# Patient Record
Sex: MALE | Race: WHITE
[De-identification: names, ages, dates, MRNs, and addresses within clinical notes are randomized per-mention and may not be internally consistent; named-entity substitution may affect disease eponyms.]

---

## 2021-12-18 ENCOUNTER — HOSPITAL ENCOUNTER (EMERGENCY)
Dept: HOSPITAL 12 - ER | Age: 61
LOS: 1 days | Discharge: ANOTHER HOSPITAL | End: 2021-12-19
Payer: COMMERCIAL

## 2021-12-18 VITALS — BODY MASS INDEX: 35.42 KG/M2 | HEIGHT: 77 IN | WEIGHT: 300 LBS
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
## 2021-12-18 DIAGNOSIS — S92.231B: Primary | ICD-10-CM

## 2021-12-18 DIAGNOSIS — Z20.822: ICD-10-CM
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
## 2021-12-18 DIAGNOSIS — S91.311A: ICD-10-CM

## 2021-12-18 DIAGNOSIS — S92.311A: ICD-10-CM

## 2021-12-18 DIAGNOSIS — S97.81XA: ICD-10-CM

## 2021-12-18 DIAGNOSIS — S92.321A: ICD-10-CM

## 2021-12-18 DIAGNOSIS — E11.9: ICD-10-CM

## 2021-12-18 PROCEDURE — A4663 DIALYSIS BLOOD PRESSURE CUFF: HCPCS

## 2021-12-18 PROCEDURE — 80048 BASIC METABOLIC PNL TOTAL CA: CPT

## 2021-12-18 PROCEDURE — 85730 THROMBOPLASTIN TIME PARTIAL: CPT

## 2021-12-18 PROCEDURE — 12002 RPR S/N/AX/GEN/TRNK2.6-7.5CM: CPT

## 2021-12-18 PROCEDURE — 90471 IMMUNIZATION ADMIN: CPT

## 2021-12-18 PROCEDURE — 96365 THER/PROPH/DIAG IV INF INIT: CPT

## 2021-12-18 PROCEDURE — 85025 COMPLETE CBC W/AUTO DIFF WBC: CPT

## 2021-12-18 PROCEDURE — 90715 TDAP VACCINE 7 YRS/> IM: CPT

## 2021-12-18 PROCEDURE — 73700 CT LOWER EXTREMITY W/O DYE: CPT

## 2021-12-18 PROCEDURE — 36415 COLL VENOUS BLD VENIPUNCTURE: CPT

## 2021-12-18 PROCEDURE — 80076 HEPATIC FUNCTION PANEL: CPT

## 2021-12-18 PROCEDURE — 87426 SARSCOV CORONAVIRUS AG IA: CPT

## 2021-12-18 PROCEDURE — 96376 TX/PRO/DX INJ SAME DRUG ADON: CPT

## 2021-12-18 PROCEDURE — 96375 TX/PRO/DX INJ NEW DRUG ADDON: CPT

## 2021-12-18 PROCEDURE — A4217 STERILE WATER/SALINE, 500 ML: HCPCS

## 2021-12-18 PROCEDURE — 99285 EMERGENCY DEPT VISIT HI MDM: CPT

## 2021-12-19 ENCOUNTER — HOSPITAL ENCOUNTER (INPATIENT)
Dept: HOSPITAL 54 - MED | Age: 61
LOS: 13 days | Discharge: HOME HEALTH | DRG: 501 | End: 2022-01-01
Attending: NURSE PRACTITIONER | Admitting: NURSE PRACTITIONER
Payer: COMMERCIAL

## 2021-12-19 VITALS — BODY MASS INDEX: 35.42 KG/M2 | HEIGHT: 77 IN | WEIGHT: 300 LBS

## 2021-12-19 VITALS — DIASTOLIC BLOOD PRESSURE: 76 MMHG | SYSTOLIC BLOOD PRESSURE: 136 MMHG

## 2021-12-19 VITALS — DIASTOLIC BLOOD PRESSURE: 78 MMHG | SYSTOLIC BLOOD PRESSURE: 142 MMHG

## 2021-12-19 VITALS — SYSTOLIC BLOOD PRESSURE: 141 MMHG | DIASTOLIC BLOOD PRESSURE: 75 MMHG

## 2021-12-19 DIAGNOSIS — D68.59: ICD-10-CM

## 2021-12-19 DIAGNOSIS — Z20.822: ICD-10-CM

## 2021-12-19 DIAGNOSIS — I10: ICD-10-CM

## 2021-12-19 DIAGNOSIS — E11.9: ICD-10-CM

## 2021-12-19 DIAGNOSIS — K59.00: ICD-10-CM

## 2021-12-19 DIAGNOSIS — S92.334A: ICD-10-CM

## 2021-12-19 DIAGNOSIS — S92.324A: ICD-10-CM
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
## 2021-12-19 DIAGNOSIS — E66.01: ICD-10-CM

## 2021-12-19 DIAGNOSIS — Z74.09: ICD-10-CM

## 2021-12-19 DIAGNOSIS — E78.5: ICD-10-CM

## 2021-12-19 DIAGNOSIS — S92.311A: Primary | ICD-10-CM
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
## 2021-12-19 LAB
ALP SERPL-CCNC: 67 U/L (ref 50–136)
ALT SERPL W/O P-5'-P-CCNC: 45 U/L (ref 16–63)
AST SERPL-CCNC: 26 U/L (ref 15–37)
BASOPHILS # BLD AUTO: 0.1 K/UL (ref 0–0.2)
BASOPHILS NFR BLD AUTO: 0.7 % (ref 0–2)
BILIRUB DIRECT SERPL-MCNC: < 0.1 MG/DL (ref 0–0.2)
BILIRUB SERPL-MCNC: 0.6 MG/DL (ref 0.2–1)
BUN SERPL-MCNC: 15 MG/DL (ref 7–18)
BUN SERPL-MCNC: 19 MG/DL (ref 7–18)
CALCIUM SERPL-MCNC: 8.7 MG/DL (ref 8.5–10.1)
CHLORIDE SERPL-SCNC: 104 MMOL/L (ref 98–107)
CHLORIDE SERPL-SCNC: 104 MMOL/L (ref 98–107)
CO2 SERPL-SCNC: 27 MMOL/L (ref 21–32)
CO2 SERPL-SCNC: 27 MMOL/L (ref 21–32)
CREAT SERPL-MCNC: 1 MG/DL (ref 0.6–1.3)
CREAT SERPL-MCNC: 1 MG/DL (ref 0.6–1.3)
EOSINOPHIL NFR BLD AUTO: 0.5 % (ref 0–6)
GLUCOSE SERPL-MCNC: 155 MG/DL (ref 74–106)
GLUCOSE SERPL-MCNC: 233 MG/DL (ref 74–106)
HCT VFR BLD AUTO: 41 % (ref 39–51)
HCT VFR BLD AUTO: 47.4 % (ref 36.7–47.1)
HGB BLD-MCNC: 13.5 G/DL (ref 13.5–17.5)
LYMPHOCYTES NFR BLD AUTO: 1.7 K/UL (ref 0.8–4.8)
LYMPHOCYTES NFR BLD AUTO: 17.9 % (ref 20–44)
MCH RBC QN AUTO: 28 UUG (ref 23.8–33.4)
MCHC RBC AUTO-ENTMCNC: 33 G/DL (ref 31–36)
MCV RBC AUTO: 84.4 FL (ref 73–96.2)
MCV RBC AUTO: 86 FL (ref 80–96)
MONOCYTES NFR BLD AUTO: 0.9 K/UL (ref 0.1–1.3)
MONOCYTES NFR BLD AUTO: 9.8 % (ref 2–12)
NEUTROPHILS # BLD AUTO: 6.7 K/UL (ref 1.8–8.9)
NEUTROPHILS NFR BLD AUTO: 71.1 % (ref 43–81)
PLATELET # BLD AUTO: 229 K/UL (ref 150–450)
PLATELET # BLD AUTO: 255 K/UL (ref 152–348)
POTASSIUM SERPL-SCNC: 3.9 MMOL/L (ref 3.5–5.1)
POTASSIUM SERPL-SCNC: 4.3 MMOL/L (ref 3.5–5.1)
RBC # BLD AUTO: 4.76 MIL/UL (ref 4.5–6)
SODIUM SERPL-SCNC: 139 MMOL/L (ref 136–145)
WBC NRBC COR # BLD AUTO: 9.5 K/UL (ref 4.3–11)
WS STN SPEC: 7.7 G/DL (ref 6.4–8.2)

## 2021-12-19 PROCEDURE — A6403 STERILE GAUZE>16 <= 48 SQ IN: HCPCS

## 2021-12-19 PROCEDURE — A6253 ABSORPT DRG > 48 SQ IN W/O B: HCPCS

## 2021-12-19 PROCEDURE — G0378 HOSPITAL OBSERVATION PER HR: HCPCS

## 2021-12-19 PROCEDURE — A4217 STERILE WATER/SALINE, 500 ML: HCPCS

## 2021-12-19 RX ADMIN — Medication SCH EACH: at 21:40

## 2021-12-19 RX ADMIN — ENOXAPARIN SODIUM SCH MG: 40 INJECTION SUBCUTANEOUS at 16:00

## 2021-12-19 RX ADMIN — INSULIN HUMAN PRN UNITS: 100 INJECTION, SOLUTION PARENTERAL at 21:45

## 2021-12-19 RX ADMIN — Medication PRN MG: at 20:18

## 2021-12-19 RX ADMIN — DEXTROSE MONOHYDRATE SCH MLS/HR: 50 INJECTION, SOLUTION INTRAVENOUS at 14:37

## 2021-12-19 RX ADMIN — Medication PRN MG: at 16:18

## 2021-12-19 RX ADMIN — Medication SCH EACH: at 17:01

## 2021-12-19 RX ADMIN — DEXTROSE MONOHYDRATE SCH MLS/HR: 50 INJECTION, SOLUTION INTRAVENOUS at 21:24

## 2021-12-19 NOTE — NUR
RN NOTE



SPOKE WITH DR BISHOP AND HE GAVE ORDERED THRU PHONE. ANCEF 1 GM EVERY 8 HRS FOR SUSPECTED 
INFECTION. ORDER READ BACK AND CARRIED OUT.

## 2021-12-19 NOTE — NUR
Received call from Dr. Oscar cramer to call podiatrist on call for consult. According to Dr. amezquita he discussed the case with Dr. Riddle.

## 2021-12-19 NOTE — NUR
RN NOTE



RECEIVED REPORT FROM NATE JAFFE OF [facility].

-------------------------------------------------------------------------------

Addendum: 12/19/21 at 0944 by LORIE GARCIA RN

-------------------------------------------------------------------------------

TIME RECEIVED WAS 0800.



CALL BACK NO [phone]

## 2021-12-19 NOTE — NUR
Spoke with Olena ESTEBAN House Supervisor of [facility], patient is 
going to [facility]. Pt is accepted by Omi Bains DNP.

## 2021-12-19 NOTE — NUR
MS RN NOTES



PATIENT COMPLAINED OF PAIN. ADMINISTERED MORPHINE 2MG IM PRN Q4H AS ORDERED BY HOSPITALIST.

## 2021-12-19 NOTE — NUR
RN NOTE



PATIENT WAS TRANSFERRED TO  [facility] VIA [facility]. A/O X4. PRIMARY LANGUAGE IS FARSI BUT CAN 
SPEAK AND UNDERSTAND ENGLISH. ABLE TO MAKE NEEDS KNOWN. A/O X4. ON ROOM AIR, TOLERATING 
WELL. DENIES SOB. IN NO APPARENT DISTRESS. VS /75 AL 83 RR 20 T 98.3 SA02 97%. IV 
ACCESS ON L AC #20, INTACT AND PATENT. NO KNOWN ALLERGIES. PATIENT HAS A KERLIX WRAPPED ON 
HIS RT FOOT D/T FRACTURE. SAFETY MEASURES MAINTAINED. BED IN LOWEST POSITION, BRAKES LOCKED. 
SIDE RAILS UP X2. CALL LIGHT WITHIN REACH. WILL CONTINUE PLAN OF CARE.

## 2021-12-19 NOTE — NUR
RN CLOSING NOTE



PATIENT RESTING IN BED. A/O X4. ON ROOM AIR, TOLERATING WELL. DENIES SOB. IN NO APPARENT 
DISTRESS. IV ACCESS ON L AC #20, INTACT AND PATENT. DUE MEDS GIVEN AS ORDERED. ALL NEEDS 
HAVE BEEN MET AND ATTENDED. SAFETY MEASURES MAINTAINED. BED IN LOWEST POSITION, BRAKES 
LOCKED. SIDE RAILS UP X2. CALL LIGHT WITHIN REACH. WILL ENDORSE CONTINUITY OF CARE TO 
INCOMING SHIFT.

## 2021-12-19 NOTE — NUR
[facility] arrived to ER to transport patient to [facility], report and 
documentation given to EMT.

## 2021-12-19 NOTE — NUR
MS RN OPENING NOTES 



RECEIVED PATIENT LAYING AWAKE IN BED. A/O X4. PATIENT WITH REGULAR AND UNLABORED BREATHING 
ON ROOM AIR, TOLERATED WELL. NO SIGNS AND SYMPTOMS OF DISTRESS NOTED. NO COMPLAINS OF PAIN 
AT THIS TIME. IV ACCESS LAC G#20 SL. ACCESS PATENT AND INTACT. SAFETY PRECAUTIONS ENFORCED 
BED LOCKED AND AT LOWEST POSITION. SIDERAILS UP X2. CALL LIGHT WITHIN REACH AT ALL TIMES. 
WILL CONTINUE TO MONITOR PATIENT.

## 2021-12-19 NOTE — NUR
[facility] called for ortho consult. according to dispatcher Dr. Riddle is on call. All info 
taken and dr will be paged. awaiting for call back from Dr Riddle.

## 2021-12-20 VITALS — DIASTOLIC BLOOD PRESSURE: 79 MMHG | SYSTOLIC BLOOD PRESSURE: 125 MMHG

## 2021-12-20 VITALS — SYSTOLIC BLOOD PRESSURE: 124 MMHG | DIASTOLIC BLOOD PRESSURE: 60 MMHG

## 2021-12-20 VITALS — DIASTOLIC BLOOD PRESSURE: 61 MMHG | SYSTOLIC BLOOD PRESSURE: 127 MMHG

## 2021-12-20 VITALS — SYSTOLIC BLOOD PRESSURE: 119 MMHG | DIASTOLIC BLOOD PRESSURE: 70 MMHG

## 2021-12-20 LAB
BASOPHILS # BLD AUTO: 0.1 K/UL (ref 0–0.2)
BASOPHILS NFR BLD AUTO: 0.7 % (ref 0–2)
BUN SERPL-MCNC: 18 MG/DL (ref 7–18)
CALCIUM SERPL-MCNC: 8.4 MG/DL (ref 8.5–10.1)
CHLORIDE SERPL-SCNC: 103 MMOL/L (ref 98–107)
CHOLEST SERPL-MCNC: 166 MG/DL (ref ?–200)
CO2 SERPL-SCNC: 25 MMOL/L (ref 21–32)
CREAT SERPL-MCNC: 0.9 MG/DL (ref 0.6–1.3)
EOSINOPHIL NFR BLD AUTO: 1 % (ref 0–6)
GLUCOSE SERPL-MCNC: 158 MG/DL (ref 74–106)
HCT VFR BLD AUTO: 40 % (ref 39–51)
HDLC SERPL-MCNC: 44 MG/DL (ref 40–60)
HGB BLD-MCNC: 13 G/DL (ref 13.5–17.5)
LDLC SERPL DIRECT ASSAY-MCNC: 98 MG/DL (ref 0–99)
LYMPHOCYTES NFR BLD AUTO: 2.6 K/UL (ref 0.8–4.8)
LYMPHOCYTES NFR BLD AUTO: 22.1 % (ref 20–44)
MAGNESIUM SERPL-MCNC: 1.9 MG/DL (ref 1.8–2.4)
MCHC RBC AUTO-ENTMCNC: 33 G/DL (ref 31–36)
MCV RBC AUTO: 85 FL (ref 80–96)
MONOCYTES NFR BLD AUTO: 1 K/UL (ref 0.1–1.3)
MONOCYTES NFR BLD AUTO: 8.8 % (ref 2–12)
NEUTROPHILS # BLD AUTO: 7.9 K/UL (ref 1.8–8.9)
NEUTROPHILS NFR BLD AUTO: 67.4 % (ref 43–81)
PHOSPHATE SERPL-MCNC: 4 MG/DL (ref 2.5–4.9)
PLATELET # BLD AUTO: 220 K/UL (ref 150–450)
POTASSIUM SERPL-SCNC: 3.8 MMOL/L (ref 3.5–5.1)
RBC # BLD AUTO: 4.64 MIL/UL (ref 4.5–6)
SODIUM SERPL-SCNC: 138 MMOL/L (ref 136–145)
TRIGL SERPL-MCNC: 96 MG/DL (ref 30–150)
WBC NRBC COR # BLD AUTO: 11.7 K/UL (ref 4.3–11)

## 2021-12-20 RX ADMIN — Medication SCH EACH: at 11:27

## 2021-12-20 RX ADMIN — DEXTROSE MONOHYDRATE SCH MLS/HR: 50 INJECTION, SOLUTION INTRAVENOUS at 12:23

## 2021-12-20 RX ADMIN — DEXTROSE MONOHYDRATE SCH MLS/HR: 50 INJECTION, SOLUTION INTRAVENOUS at 20:17

## 2021-12-20 RX ADMIN — INSULIN HUMAN PRN UNITS: 100 INJECTION, SOLUTION PARENTERAL at 07:20

## 2021-12-20 RX ADMIN — INSULIN HUMAN PRN UNITS: 100 INJECTION, SOLUTION PARENTERAL at 11:26

## 2021-12-20 RX ADMIN — Medication SCH EACH: at 07:18

## 2021-12-20 RX ADMIN — NEOMYCIN AND POLYMYXIN B SULFATES AND BACITRACIN ZINC SCH GM: 400; 3.5; 5 OINTMENT TOPICAL at 11:23

## 2021-12-20 RX ADMIN — Medication SCH EACH: at 17:10

## 2021-12-20 RX ADMIN — INSULIN HUMAN PRN UNITS: 100 INJECTION, SOLUTION PARENTERAL at 17:14

## 2021-12-20 RX ADMIN — ASPIRIN 81 MG SCH MG: 81 TABLET ORAL at 08:05

## 2021-12-20 RX ADMIN — DEXTROSE MONOHYDRATE SCH MLS/HR: 50 INJECTION, SOLUTION INTRAVENOUS at 04:47

## 2021-12-20 RX ADMIN — ENOXAPARIN SODIUM SCH MG: 40 INJECTION SUBCUTANEOUS at 16:00

## 2021-12-20 RX ADMIN — Medication PRN MG: at 00:44

## 2021-12-20 RX ADMIN — Medication SCH EACH: at 22:35

## 2021-12-20 RX ADMIN — ATORVASTATIN CALCIUM SCH MG: 40 TABLET, FILM COATED ORAL at 08:05

## 2021-12-20 NOTE — NUR
MS RN OPENING NOTES 



RECEIVED PATIENT IN BED, AWAKE, A/O X4. ON ROOM AIR, TOLERATING WELL. NO SIGNS AND SYMPTOMS 
OF ACUTE DISTRESS NOTED. NO COMPLAINTS OF PAIN AT THIS TIME. IV ACCESS LAC G#20 SL. ACCESS 
PATENT AND INTACT. SAFETY PRECAUTIONS IN PLACE:  BED LOCKED AND AT LOWEST POSITION. SIDE 
RAILS UP X2. CALL LIGHT WITHIN REACH AT ALL TIMES. WILL CONTINUE TO MONITOR PATIENT 
ACCORDINGLY.

## 2021-12-20 NOTE — NUR
NATE NOTES



CONSENT FOR IRRIGATION AND DRAINAGE SECURED AS PER DR. SANTIZO'S ORDER.

-------------------------------------------------------------------------------

Addendum: 12/20/21 at 1737 by RAIN LION RN

-------------------------------------------------------------------------------

CONSENT FOR INCISION AND DRAINAGE SECURED.

## 2021-12-20 NOTE — NUR
MS RN CLOSING NOTES 



PATIENT STILL LAYING AWAKE IN BED. A/O X4. PATIENT WITH REGULAR AND UNLABORED BREATHING ON 
ROOM AIR, TOLERATED WELL. NO SIGNS AND SYMPTOMS OF DISTRESS NOTED. NO COMPLAINS OF PAIN AT 
THIS TIME. IV ACCESS LAC G#20 SL. ACCESS PATENT AND INTACT. SAFETY PRECAUTIONS ENFORCED BED 
LOCKED AND AT LOWEST POSITION. SIDERAILS UP X2. CALL LIGHT WITHIN REACH AT ALL TIMES. WILL 
ENDORSE COLIN TO DAY SHIFT NURSE.

## 2021-12-20 NOTE — NUR
RN NOTES



SEEN BY WOUND CARE CONSULT. WOUND CARE DONE. PODIATRIST SHEELA GUEVARA AWARE OF 
PATIENT'S REFERRAL.

## 2021-12-20 NOTE — NUR
WOUND CARE CONSULT: PT SEEN WITH DR SANTIZO FOR RT FOOT SWELLING WITH FRACTURES AND SUTURED 
WOUNDS TO RT PLANTAR TOE SURFACE AND SOME OPEN AREAS TO DORSAL GREAT TOE WITH DISCOLORATION 
TO FOOT, PRESENT ON ADMISSION. RECOMMENDATIONS MADE FOR SKIN PROTECTION AND WOUND CARE.  
DISCUSSED WITH MD AND NURSING STAFF. DPM CONSULT ALSO REQUESTED TO FOLLOW WOUNDS. DR OMALLEY 
NOTIFIED. FOOT ELEVATED. MD IN AGREEMENT WITH PLAN OF CARE.

## 2021-12-20 NOTE — NUR
MS RN OPENING NOTES

Patient A&Ox4 resting in bed at this time. Able to make needs known. Denies any needs at 
this time. No signs of distress. Reminded pt. to use urinal and call if having BM for 
assistance as pt. is NWB to R foot. Patient says there is some R foot discomfort but is 
tolerable at this time. Reminded pt. will be NPO except meds post midnight for irrigation 
and drainage of R foot wound. Pt. agreeable. Will continue to monitor.

## 2021-12-20 NOTE — NUR
MS RN CLOSING NOTES 



PATIENT IN BED, AWAKE, A/O X4. ON ROOM AIR, TOLERATING WELL. NO SIGNS AND SYMPTOMS OF ACUTE 
DISTRESS NOTED. NO COMPLAINTS OF PAIN AT THIS TIME. IV ACCESS LAC G#20 SL. ACCESS PATENT AND 
INTACT. SAFETY PRECAUTIONS IN PLACE:  BED LOCKED AND AT LOWEST POSITION. SIDE RAILS UP X2. 
CALL LIGHT WITHIN REACH AT ALL TIMES. INSTRUCTED ON NPO POST MIDNIGHT, PATIENT VERBALIZED 
UNDERSTADNING OF INSTRUCTIONS GIVEN. DUE MEDS GIVEN AS ORDERED, ALL NEEDS ATTENDED AND MET. 
WILL ENDORSE TO ONCOMING SHIFT FOR COLIN.

## 2021-12-21 VITALS — SYSTOLIC BLOOD PRESSURE: 110 MMHG | DIASTOLIC BLOOD PRESSURE: 68 MMHG

## 2021-12-21 VITALS — DIASTOLIC BLOOD PRESSURE: 79 MMHG | SYSTOLIC BLOOD PRESSURE: 145 MMHG

## 2021-12-21 VITALS — DIASTOLIC BLOOD PRESSURE: 84 MMHG | SYSTOLIC BLOOD PRESSURE: 122 MMHG

## 2021-12-21 LAB
BASOPHILS # BLD AUTO: 0.1 K/UL (ref 0–0.2)
BASOPHILS NFR BLD AUTO: 0.6 % (ref 0–2)
BUN SERPL-MCNC: 21 MG/DL (ref 7–18)
CALCIUM SERPL-MCNC: 8.4 MG/DL (ref 8.5–10.1)
CHLORIDE SERPL-SCNC: 104 MMOL/L (ref 98–107)
CO2 SERPL-SCNC: 25 MMOL/L (ref 21–32)
CREAT SERPL-MCNC: 0.8 MG/DL (ref 0.6–1.3)
EOSINOPHIL NFR BLD AUTO: 1.4 % (ref 0–6)
GLUCOSE SERPL-MCNC: 145 MG/DL (ref 74–106)
HCT VFR BLD AUTO: 37 % (ref 39–51)
HGB BLD-MCNC: 12.6 G/DL (ref 13.5–17.5)
LYMPHOCYTES NFR BLD AUTO: 2.5 K/UL (ref 0.8–4.8)
LYMPHOCYTES NFR BLD AUTO: 24.6 % (ref 20–44)
MAGNESIUM SERPL-MCNC: 2 MG/DL (ref 1.8–2.4)
MCHC RBC AUTO-ENTMCNC: 34 G/DL (ref 31–36)
MCV RBC AUTO: 85 FL (ref 80–96)
MONOCYTES NFR BLD AUTO: 0.9 K/UL (ref 0.1–1.3)
MONOCYTES NFR BLD AUTO: 9.2 % (ref 2–12)
NEUTROPHILS # BLD AUTO: 6.4 K/UL (ref 1.8–8.9)
NEUTROPHILS NFR BLD AUTO: 64.2 % (ref 43–81)
PHOSPHATE SERPL-MCNC: 3.9 MG/DL (ref 2.5–4.9)
PLATELET # BLD AUTO: 202 K/UL (ref 150–450)
POTASSIUM SERPL-SCNC: 3.5 MMOL/L (ref 3.5–5.1)
RBC # BLD AUTO: 4.35 MIL/UL (ref 4.5–6)
SODIUM SERPL-SCNC: 138 MMOL/L (ref 136–145)
WBC NRBC COR # BLD AUTO: 10 K/UL (ref 4.3–11)

## 2021-12-21 PROCEDURE — 0KBV0ZZ EXCISION OF RIGHT FOOT MUSCLE, OPEN APPROACH: ICD-10-PCS | Performed by: STUDENT IN AN ORGANIZED HEALTH CARE EDUCATION/TRAINING PROGRAM

## 2021-12-21 RX ADMIN — NEOMYCIN AND POLYMYXIN B SULFATES AND BACITRACIN ZINC SCH GM: 400; 3.5; 5 OINTMENT TOPICAL at 08:16

## 2021-12-21 RX ADMIN — Medication SCH EACH: at 22:38

## 2021-12-21 RX ADMIN — Medication PRN MG: at 14:46

## 2021-12-21 RX ADMIN — Medication SCH EACH: at 06:46

## 2021-12-21 RX ADMIN — ENOXAPARIN SODIUM SCH MG: 40 INJECTION SUBCUTANEOUS at 16:00

## 2021-12-21 RX ADMIN — Medication PRN MG: at 01:21

## 2021-12-21 RX ADMIN — INSULIN HUMAN PRN UNITS: 100 INJECTION, SOLUTION PARENTERAL at 22:48

## 2021-12-21 RX ADMIN — DEXTROSE MONOHYDRATE SCH MLS/HR: 50 INJECTION, SOLUTION INTRAVENOUS at 12:08

## 2021-12-21 RX ADMIN — Medication PRN MG: at 20:54

## 2021-12-21 RX ADMIN — INSULIN HUMAN PRN UNITS: 100 INJECTION, SOLUTION PARENTERAL at 16:55

## 2021-12-21 RX ADMIN — DEXTROSE MONOHYDRATE SCH MLS/HR: 50 INJECTION, SOLUTION INTRAVENOUS at 22:39

## 2021-12-21 RX ADMIN — INSULIN HUMAN PRN UNITS: 100 INJECTION, SOLUTION PARENTERAL at 12:08

## 2021-12-21 RX ADMIN — ATORVASTATIN CALCIUM SCH MG: 40 TABLET, FILM COATED ORAL at 08:13

## 2021-12-21 RX ADMIN — ASPIRIN 81 MG SCH MG: 81 TABLET ORAL at 08:13

## 2021-12-21 RX ADMIN — DEXTROSE MONOHYDRATE SCH MLS/HR: 50 INJECTION, SOLUTION INTRAVENOUS at 05:16

## 2021-12-21 RX ADMIN — Medication SCH EACH: at 12:08

## 2021-12-21 RX ADMIN — Medication SCH EACH: at 16:55

## 2021-12-21 NOTE — NUR
RN OPENING NOTE



RECEIVED PATIENT IN BED. A/O X4. ON ROOM AIR, TOLERATING WELL. DENIES SOB. IN NO APPARENT 
DISTRESS. CHANGED DIET TO NPO EXCEPT MEDS AS ORDERED BY DR SANTIZO. PATIENT WILL BE GOING 
FOR I & D OF THE RIGHT FOOT. NON WEIGHT BEARING ON R FT. IV ACCESS ON L AC #20 G, INTACT AND 
PATENT. SAFETY MEASURES MAINTAINED. BED IN LOWEST POSITION, BRAKES, LOCKED. SIDE RAILS UP 
X2. CALL LIGHT WITHIN REACH. WILL CONTINUE PLAN OF CARE.

## 2021-12-21 NOTE — NUR
MS RN OPENING NOTE



PATIENT RECEIVED AWAKE IN BED. NO S/S OF DISTRESS, BREATHING SYMMETRICAL. LAC #20 SL PATENT. 
SAFETY MEASURES IN PLACE: BED AT LOWEST POSITION, RAILS UP X2, CALL BELL WITHIN REACH. WILL 
CONTINUE TO MONITOR PATIENT.

## 2021-12-21 NOTE — NUR
Patient A&Ox4. VSS overnight. Only c/o is of foot pain to R foot fx site. Pain management as 
per MD order effective. Dressing is c/d/i. Patient is NWB to R foot. Patient has been NPO 
since midnight for AM I&D procedure. No s/s of hypo or hyperglycemic reactions noted. AM BS 
137 insulin held d/t patient NPO status. Tolerating IV ABX well.

## 2021-12-21 NOTE — NUR
RN CLOSING NOTE



PATIENT RESTING IN BED. A/O X4. ON ROOM AIR, TOLERATING WELL. DENIES SOB. IN NO APPARENT 
DISTRESS. NON WEIGHT BEARING ON R FT. IV ACCESS ON L AC #20 G, INTACT AND PATENT. DUE MEDS 
GIVEN AS ORDERED. ALL NEEDS HAVE BEEN MET AND ATTENDED. SAFETY MEASURES MAINTAINED. BED IN 
LOWEST POSITION, BRAKES, LOCKED. SIDE RAILS UP X2. CALL LIGHT WITHIN REACH. WILL ENDORSE 
CONTINUITY OF CARE TO INCOMING SHIFT.

## 2021-12-21 NOTE — NUR
RN NOTE



PATIENT WAS BROUGHT BACK TO HIS ROOM, STABLE. /77 UT 68 RR 19 SA02 96% ORDERS ARE TO 
CONTINUE ALL MEDS, DIET AND NON WEIGHT BEARING ON RLE.

## 2021-12-22 VITALS — DIASTOLIC BLOOD PRESSURE: 71 MMHG | SYSTOLIC BLOOD PRESSURE: 128 MMHG

## 2021-12-22 VITALS — SYSTOLIC BLOOD PRESSURE: 136 MMHG | DIASTOLIC BLOOD PRESSURE: 88 MMHG

## 2021-12-22 VITALS — SYSTOLIC BLOOD PRESSURE: 126 MMHG | DIASTOLIC BLOOD PRESSURE: 84 MMHG

## 2021-12-22 LAB
BASOPHILS # BLD AUTO: 0 K/UL (ref 0–0.2)
BASOPHILS NFR BLD AUTO: 0.3 % (ref 0–2)
BUN SERPL-MCNC: 20 MG/DL (ref 7–18)
CALCIUM SERPL-MCNC: 8.6 MG/DL (ref 8.5–10.1)
CHLORIDE SERPL-SCNC: 103 MMOL/L (ref 98–107)
CO2 SERPL-SCNC: 25 MMOL/L (ref 21–32)
CREAT SERPL-MCNC: 0.8 MG/DL (ref 0.6–1.3)
EOSINOPHIL NFR BLD AUTO: 0.8 % (ref 0–6)
GLUCOSE SERPL-MCNC: 175 MG/DL (ref 74–106)
HCT VFR BLD AUTO: 40 % (ref 39–51)
HGB BLD-MCNC: 13.3 G/DL (ref 13.5–17.5)
LYMPHOCYTES NFR BLD AUTO: 19.4 % (ref 20–44)
LYMPHOCYTES NFR BLD AUTO: 2.5 K/UL (ref 0.8–4.8)
MCHC RBC AUTO-ENTMCNC: 33 G/DL (ref 31–36)
MCV RBC AUTO: 85 FL (ref 80–96)
MONOCYTES NFR BLD AUTO: 1 K/UL (ref 0.1–1.3)
MONOCYTES NFR BLD AUTO: 7.6 % (ref 2–12)
NEUTROPHILS # BLD AUTO: 9.2 K/UL (ref 1.8–8.9)
NEUTROPHILS NFR BLD AUTO: 71.9 % (ref 43–81)
PLATELET # BLD AUTO: 231 K/UL (ref 150–450)
POTASSIUM SERPL-SCNC: 3.6 MMOL/L (ref 3.5–5.1)
RBC # BLD AUTO: 4.69 MIL/UL (ref 4.5–6)
SODIUM SERPL-SCNC: 139 MMOL/L (ref 136–145)
WBC NRBC COR # BLD AUTO: 12.8 K/UL (ref 4.3–11)

## 2021-12-22 RX ADMIN — NEOMYCIN AND POLYMYXIN B SULFATES AND BACITRACIN ZINC SCH GM: 400; 3.5; 5 OINTMENT TOPICAL at 09:11

## 2021-12-22 RX ADMIN — Medication SCH EACH: at 22:00

## 2021-12-22 RX ADMIN — Medication SCH EACH: at 12:14

## 2021-12-22 RX ADMIN — DEXTROSE MONOHYDRATE SCH MLS/HR: 50 INJECTION, SOLUTION INTRAVENOUS at 12:07

## 2021-12-22 RX ADMIN — Medication PRN MG: at 21:20

## 2021-12-22 RX ADMIN — ASPIRIN 81 MG SCH MG: 81 TABLET ORAL at 08:53

## 2021-12-22 RX ADMIN — INSULIN HUMAN PRN UNITS: 100 INJECTION, SOLUTION PARENTERAL at 22:01

## 2021-12-22 RX ADMIN — INSULIN HUMAN PRN UNITS: 100 INJECTION, SOLUTION PARENTERAL at 06:54

## 2021-12-22 RX ADMIN — Medication SCH EACH: at 17:39

## 2021-12-22 RX ADMIN — INSULIN HUMAN PRN UNITS: 100 INJECTION, SOLUTION PARENTERAL at 17:40

## 2021-12-22 RX ADMIN — INSULIN HUMAN PRN UNITS: 100 INJECTION, SOLUTION PARENTERAL at 12:15

## 2021-12-22 RX ADMIN — ENOXAPARIN SODIUM SCH MG: 40 INJECTION SUBCUTANEOUS at 16:50

## 2021-12-22 RX ADMIN — Medication SCH EACH: at 06:51

## 2021-12-22 RX ADMIN — DEXTROSE MONOHYDRATE SCH MLS/HR: 50 INJECTION, SOLUTION INTRAVENOUS at 20:48

## 2021-12-22 RX ADMIN — DEXTROSE MONOHYDRATE SCH MLS/HR: 50 INJECTION, SOLUTION INTRAVENOUS at 05:59

## 2021-12-22 RX ADMIN — ATORVASTATIN CALCIUM SCH MG: 40 TABLET, FILM COATED ORAL at 08:50

## 2021-12-22 NOTE — NUR
RN CLOSING NOTES



PT IN BED, AWAKE, A&OX4. PT IS ABLE TO ADDRESS NEEDS. NO COMPLAINT OF PAIN OR DISCOMFORT AT 
THIS TIME. IV LINES INTACT AND PATENT. ALL NEEDS MET, ALL ORDERS CARRIED OUT. SAFETY 
MEASURES MAINTAINED: BED LOCKED, LOWEST POSITION, CALL LIGHT WITHIN REACH. WILL ENDORSE TO 
NIGHT SHIFT RN FOR COLIN.

## 2021-12-22 NOTE — NUR
MS RN OPENING NOTE



PATIENT RECEIVED AWAKE IN BED. A/OX4. LAC #20 PATENT. NO S/S OF DISTRESS, BREATHING 
SYMMETRICAL. SAFETY MEASURES IN PLACE: BED AT LOWEST POSITION, RAILS UP X2, CALL BELL WITHIN 
REACH. WILL CONTINUE TO MONITOR PATIENT.

## 2021-12-22 NOTE — NUR
MS RN CLOSING NOTE



PATIENT IS AWAKE IN BED. A/OX4. NO S/S OF DISTRESS; BREATHING SYMMETRICAL. SAFETY MEASURES 
IN PLACE: BED AT LOWEST POSITION, RAILS UP X2, CALL BELL WITHIN REACH. WILL ENDORSE TO NEXT 
SHIFT FOR COLIN.

## 2021-12-23 VITALS — SYSTOLIC BLOOD PRESSURE: 118 MMHG | DIASTOLIC BLOOD PRESSURE: 56 MMHG

## 2021-12-23 VITALS — DIASTOLIC BLOOD PRESSURE: 85 MMHG | SYSTOLIC BLOOD PRESSURE: 137 MMHG

## 2021-12-23 VITALS — DIASTOLIC BLOOD PRESSURE: 75 MMHG | SYSTOLIC BLOOD PRESSURE: 122 MMHG

## 2021-12-23 RX ADMIN — DEXTROSE MONOHYDRATE SCH MLS/HR: 50 INJECTION, SOLUTION INTRAVENOUS at 12:57

## 2021-12-23 RX ADMIN — Medication SCH EACH: at 22:00

## 2021-12-23 RX ADMIN — Medication SCH EACH: at 11:57

## 2021-12-23 RX ADMIN — INSULIN HUMAN PRN UNITS: 100 INJECTION, SOLUTION PARENTERAL at 06:40

## 2021-12-23 RX ADMIN — DEXTROSE MONOHYDRATE SCH MLS/HR: 50 INJECTION, SOLUTION INTRAVENOUS at 21:43

## 2021-12-23 RX ADMIN — DEXTROSE MONOHYDRATE SCH MLS/HR: 50 INJECTION, SOLUTION INTRAVENOUS at 04:18

## 2021-12-23 RX ADMIN — INSULIN HUMAN PRN UNITS: 100 INJECTION, SOLUTION PARENTERAL at 12:15

## 2021-12-23 RX ADMIN — Medication SCH EACH: at 06:35

## 2021-12-23 RX ADMIN — ATORVASTATIN CALCIUM SCH MG: 40 TABLET, FILM COATED ORAL at 09:34

## 2021-12-23 RX ADMIN — ENOXAPARIN SODIUM SCH MG: 40 INJECTION SUBCUTANEOUS at 17:21

## 2021-12-23 RX ADMIN — INSULIN HUMAN PRN UNITS: 100 INJECTION, SOLUTION PARENTERAL at 23:14

## 2021-12-23 RX ADMIN — ASPIRIN 81 MG SCH MG: 81 TABLET ORAL at 09:34

## 2021-12-23 RX ADMIN — NEOMYCIN AND POLYMYXIN B SULFATES AND BACITRACIN ZINC SCH GM: 400; 3.5; 5 OINTMENT TOPICAL at 10:20

## 2021-12-23 RX ADMIN — Medication SCH EACH: at 19:08

## 2021-12-23 NOTE — NUR
MS RN CLOSING NOTES



.PT IN BED, AWAKE, A/O X4. NO SIGNS OF ACUTE DISTRESS. ON ROOM AIR, NO SOB NOTED, BREATHING 
EVEN AND UNLABORED. IV ACCESS ON LEFT AC #20G ON SALINE LOCK, PATENT AND INTACT. WITH RIGHT 
FOOT SURGICAL WOUND COVERED WITH DRY DRESSING AND ELASTIC BANDAGE, DRY AND INTACT. DENIES 
ANY PAIN OR DISCOMFORT AT THIS TIME. SAFETY MEASURES IN PLACE. BED LOCKED AND IN LOWEST 
POSITION, SIDE RAILS UP X2, CALL LIGHT PLACED WITHIN EASY REACH. STILL FOR DISCHARGE, 
AWAITING PLACEMENT. WILL ENDORSE TO NEXT SHIFT FOR CONTINUITY OF CARE.

## 2021-12-23 NOTE — NUR
MS RN CLOSING NOTE



PATIENT IS ASLEEP IN BED. A/OX4. NO S/S OF DISTRESS, BREATHING SYMMETRICAL AND UNLABORED. 
SAFETY MEASURES IN PLACE: BED AT LOWEST POSITION, RAILS UP X2, CALL BELL WITHIN REACH. WILL 
ENDORSE TO NEXT SHIFT FOR COLIN.

## 2021-12-23 NOTE — NUR
MS RN OPENING NOTES



RECEIVED PT IN BED, AWAKE, A/O X4. NO SIGNS OF ACUTE DISTRESS. ON ROOM AIR, NO SOB NOTED, 
BREATHING EVEN AND UNLABORED. IV ACCESS ON LEFT AC #20G ON SALINE LOCK, PATENT AND INTACT. 
WITH RIGHT FOOT SURGICAL WOUND COVERED WITH DRY DRESSING AND ELASTIC BANDAGE, DRY AND 
INTACT. DENIES ANY PAIN OR DISCOMFORT AT THIS TIME. SAFETY MEASURES IN PLACE. BED LOCKED AND 
IN LOWEST POSITION, SIDE RAILS UP X2, CALL LIGHT PLACED WITHIN EASY REACH, WILL CONTINUE TO 
MONITOR PATIENT.

## 2021-12-24 VITALS — DIASTOLIC BLOOD PRESSURE: 63 MMHG | SYSTOLIC BLOOD PRESSURE: 106 MMHG

## 2021-12-24 VITALS — DIASTOLIC BLOOD PRESSURE: 87 MMHG | SYSTOLIC BLOOD PRESSURE: 142 MMHG

## 2021-12-24 VITALS — SYSTOLIC BLOOD PRESSURE: 112 MMHG | DIASTOLIC BLOOD PRESSURE: 87 MMHG

## 2021-12-24 VITALS — SYSTOLIC BLOOD PRESSURE: 169 MMHG | DIASTOLIC BLOOD PRESSURE: 70 MMHG

## 2021-12-24 RX ADMIN — Medication SCH EACH: at 21:40

## 2021-12-24 RX ADMIN — ENOXAPARIN SODIUM SCH MG: 40 INJECTION SUBCUTANEOUS at 16:20

## 2021-12-24 RX ADMIN — Medication PRN MG: at 21:04

## 2021-12-24 RX ADMIN — Medication SCH EACH: at 17:02

## 2021-12-24 RX ADMIN — DEXTROSE MONOHYDRATE SCH MLS/HR: 50 INJECTION, SOLUTION INTRAVENOUS at 05:52

## 2021-12-24 RX ADMIN — Medication SCH EACH: at 12:00

## 2021-12-24 RX ADMIN — INSULIN HUMAN PRN UNITS: 100 INJECTION, SOLUTION PARENTERAL at 17:01

## 2021-12-24 RX ADMIN — ATORVASTATIN CALCIUM SCH MG: 40 TABLET, FILM COATED ORAL at 08:37

## 2021-12-24 RX ADMIN — INSULIN HUMAN PRN UNITS: 100 INJECTION, SOLUTION PARENTERAL at 22:03

## 2021-12-24 RX ADMIN — ASPIRIN 81 MG SCH MG: 81 TABLET ORAL at 08:37

## 2021-12-24 RX ADMIN — DEXTROSE MONOHYDRATE SCH MLS/HR: 50 INJECTION, SOLUTION INTRAVENOUS at 12:00

## 2021-12-24 RX ADMIN — NEOMYCIN AND POLYMYXIN B SULFATES AND BACITRACIN ZINC SCH GM: 400; 3.5; 5 OINTMENT TOPICAL at 09:29

## 2021-12-24 RX ADMIN — DEXTROSE MONOHYDRATE SCH MLS/HR: 50 INJECTION, SOLUTION INTRAVENOUS at 21:03

## 2021-12-24 RX ADMIN — Medication SCH EACH: at 06:27

## 2021-12-24 RX ADMIN — INSULIN HUMAN PRN UNITS: 100 INJECTION, SOLUTION PARENTERAL at 06:28

## 2021-12-24 RX ADMIN — INSULIN HUMAN PRN UNITS: 100 INJECTION, SOLUTION PARENTERAL at 11:46

## 2021-12-24 NOTE — NUR
MS RN NOTES  PAIN MANAGEMENT

C/O RIGHT FOOT PAIN 8/10 ON PAIN SCALE,MORPHINE 2MG IV GIVEN FOR STRONG PAIN.

## 2021-12-24 NOTE — NUR
RN CLOSING NOTES

Patient lying in bed, respirations even and unlabored, no SOB, no dizziness, no 
palpitations, no apparent distress noted, denies any pain or discomfort, no grimacing. All 
medications given per MD order, tolerating well. No s/s of hypo/hyperglycemia at this time, 
no change in level of consciousness no tremors, insulin given per sliding scale as needed 
per MD order. All needs attended, kept clean and dry, call light left within reach, safety 
precautions in place, brakes locked, side rails up X 2, will endorse to next shift for 
continuity of care.

## 2021-12-24 NOTE — NUR
RN OPENING NOTES

Patient seen comfortably lying in bed, breathing even and unlabored, no SOB, no apparent 
distress noted, denies any pain or discomfort at this time, no grimacing. Call light left 
within reach, safety precautions in place, brakes locked, side rails up X 2, will monitor 
closely for any changes.

## 2021-12-24 NOTE — NUR
MS RN NOTES

RECEIVED ON BED,ON LEFT SIDE POSITION,SLEEPING,BREATHING EASY,NO SOB,SALINE LOCK LEFT AC 
INTACT AND PATENT.S/P RIGHT FOOT IRRIGATION DEBRIDEMENT,DRESSING INTACT AND DRY.PAIN 
TOLERABLE AT THE MOMENT.ABLE TO AMBULATE WITH WALKER,NON WEIGHT BEARING ON RIGHT 
FOOT,PATIENT AWARE.CALL LIGHT IN REACH,NEEDS ANTICIPATED.

## 2021-12-25 VITALS — SYSTOLIC BLOOD PRESSURE: 127 MMHG | DIASTOLIC BLOOD PRESSURE: 80 MMHG

## 2021-12-25 VITALS — DIASTOLIC BLOOD PRESSURE: 76 MMHG | SYSTOLIC BLOOD PRESSURE: 117 MMHG

## 2021-12-25 VITALS — DIASTOLIC BLOOD PRESSURE: 79 MMHG | SYSTOLIC BLOOD PRESSURE: 125 MMHG

## 2021-12-25 RX ADMIN — ATORVASTATIN CALCIUM SCH MG: 40 TABLET, FILM COATED ORAL at 09:37

## 2021-12-25 RX ADMIN — DEXTROSE MONOHYDRATE SCH MLS/HR: 50 INJECTION, SOLUTION INTRAVENOUS at 21:37

## 2021-12-25 RX ADMIN — INSULIN HUMAN PRN UNITS: 100 INJECTION, SOLUTION PARENTERAL at 22:59

## 2021-12-25 RX ADMIN — DEXTROSE MONOHYDRATE SCH MLS/HR: 50 INJECTION, SOLUTION INTRAVENOUS at 12:10

## 2021-12-25 RX ADMIN — ASPIRIN 81 MG SCH MG: 81 TABLET ORAL at 09:37

## 2021-12-25 RX ADMIN — INSULIN HUMAN PRN UNITS: 100 INJECTION, SOLUTION PARENTERAL at 16:57

## 2021-12-25 RX ADMIN — INSULIN HUMAN PRN UNITS: 100 INJECTION, SOLUTION PARENTERAL at 05:37

## 2021-12-25 RX ADMIN — Medication SCH EACH: at 16:57

## 2021-12-25 RX ADMIN — DEXTROSE MONOHYDRATE SCH MLS/HR: 50 INJECTION, SOLUTION INTRAVENOUS at 04:53

## 2021-12-25 RX ADMIN — ENOXAPARIN SODIUM SCH MG: 40 INJECTION SUBCUTANEOUS at 16:49

## 2021-12-25 RX ADMIN — NEOMYCIN AND POLYMYXIN B SULFATES AND BACITRACIN ZINC SCH GM: 400; 3.5; 5 OINTMENT TOPICAL at 09:37

## 2021-12-25 RX ADMIN — Medication SCH EACH: at 12:21

## 2021-12-25 RX ADMIN — Medication SCH EACH: at 22:49

## 2021-12-25 RX ADMIN — Medication SCH EACH: at 05:35

## 2021-12-25 RX ADMIN — Medication PRN MG: at 15:43

## 2021-12-25 NOTE — NUR
RN NOTES:

AT 1908 RECEIVED PATIENT AWAKE ON BED TALKING ON THE PHONE, A/OX4, ORIENTED TO UNIT AND 
STAFF, CONVERSANT, ON ROOM AIR, NO SIGN OF ANY RESPIRATORY DISTRESS, BREATHING SPONTANEOUSLY 
IN ROOM AIR, NWB-ON RLE, ABLE TO AMBULATE WITH WALKER, RIGHT WOUND DRESSING DRY AND INTACT, 
NO DRAINAGE NOTED.LAC G#22 INTACT

-FOR POSSIBLE DISCHARGE, AWAITING FOR HIS PLACEMENT, POST 72 HOURS IV/ANTIBIOTIC GIVEN.

## 2021-12-25 NOTE — NUR
MS RN OPENING  NOTES

RECEIVED PATIENT ON BED, AWAKE AND A/O X4. ON ROOM AIR BREATHING EVENLY AND UNLABORED. NOT 
IN DISTRESS. WITH NO COMPLAINTS OF PAIN OR DISCOMFORT AT THIS TIME. WITH IV ACCESS AT LEFT 
AC G20, INTACT AND PATENT. SAFETY MEASURES IN PLACE. CALL LIGHT WITHIN REACH. BED ON LOWEST 
AND LOCKED POSITION, SIDE RAILS UP X2. WILL CONTINUE TO MONITOR.

## 2021-12-25 NOTE — NUR
MS RN NOTES

FAIRLY RESTED AT NIGHT,RIGHT FOOT PAIN IMPROVED,IV ABX TOLERATED WELL.FOR DISCHARGE 
PLANNING,CASE MANAGEMENT WORKING FOR PLACEMENT.MD TO CHANGE DRESSING ON RIGHT FOOT AS 
REPORTED.CALL LIGHT IN REACH,NEEDS ATTENDED.

## 2021-12-25 NOTE — NUR
MS RN CLOSING  NOTES

PATIENT RESTING ON BED, AWAKE AND A/O X4. ON ROOM AIR BREATHING EVENLY AND UNLABORED. NOT IN 
DISTRESS. WITH NO COMPLAINTS OF PAIN OR DISCOMFORT AT THIS TIME. WITH IV ACCESS AT LEFT HAND 
G22 SALINE LOCKED, INTACT AND PATENT. SAFETY MEASURES IN PLACE. CALL LIGHT WITHIN REACH. BED 
ON LOWEST AND LOCKED POSITION, SIDE RAILS UP X2. WILL ENDORSE TO NEXT SHIFT FOR COLIN.

## 2021-12-26 VITALS — SYSTOLIC BLOOD PRESSURE: 112 MMHG | DIASTOLIC BLOOD PRESSURE: 56 MMHG

## 2021-12-26 VITALS — SYSTOLIC BLOOD PRESSURE: 104 MMHG | DIASTOLIC BLOOD PRESSURE: 79 MMHG

## 2021-12-26 VITALS — DIASTOLIC BLOOD PRESSURE: 64 MMHG | SYSTOLIC BLOOD PRESSURE: 117 MMHG

## 2021-12-26 RX ADMIN — Medication PRN MG: at 22:09

## 2021-12-26 RX ADMIN — Medication PRN MG: at 15:10

## 2021-12-26 RX ADMIN — Medication SCH EACH: at 17:16

## 2021-12-26 RX ADMIN — DEXTROSE MONOHYDRATE SCH MLS/HR: 50 INJECTION, SOLUTION INTRAVENOUS at 04:19

## 2021-12-26 RX ADMIN — INSULIN HUMAN PRN UNITS: 100 INJECTION, SOLUTION PARENTERAL at 06:52

## 2021-12-26 RX ADMIN — ATORVASTATIN CALCIUM SCH MG: 40 TABLET, FILM COATED ORAL at 09:01

## 2021-12-26 RX ADMIN — ASPIRIN 81 MG SCH MG: 81 TABLET ORAL at 09:01

## 2021-12-26 RX ADMIN — Medication SCH EACH: at 22:39

## 2021-12-26 RX ADMIN — Medication SCH EACH: at 12:36

## 2021-12-26 RX ADMIN — INSULIN HUMAN PRN UNITS: 100 INJECTION, SOLUTION PARENTERAL at 12:34

## 2021-12-26 RX ADMIN — DEXTROSE MONOHYDRATE SCH MLS/HR: 50 INJECTION, SOLUTION INTRAVENOUS at 21:21

## 2021-12-26 RX ADMIN — Medication SCH EACH: at 06:51

## 2021-12-26 RX ADMIN — NEOMYCIN AND POLYMYXIN B SULFATES AND BACITRACIN ZINC SCH GM: 400; 3.5; 5 OINTMENT TOPICAL at 09:01

## 2021-12-26 RX ADMIN — ENOXAPARIN SODIUM SCH MG: 40 INJECTION SUBCUTANEOUS at 16:29

## 2021-12-26 RX ADMIN — INSULIN HUMAN PRN UNITS: 100 INJECTION, SOLUTION PARENTERAL at 22:46

## 2021-12-26 RX ADMIN — INSULIN HUMAN PRN UNITS: 100 INJECTION, SOLUTION PARENTERAL at 17:17

## 2021-12-26 RX ADMIN — DEXTROSE MONOHYDRATE SCH MLS/HR: 50 INJECTION, SOLUTION INTRAVENOUS at 12:36

## 2021-12-26 NOTE — NUR
RN ms opening notes

Pt is laying in bed comfortably listening music on his cellphone. Pt is alert and 
orientedX4. Respiration is normal in room air. No SOB. No S/s of distress noted. IV site on 
L hand# 20 is clean, intact and flushes well. dressing on R foot is clean, intact and dry. 
Safety precautions is maintained. bed at low position, brakes locked, side rails upX3, hob 
elevated, call light is within reach. Will continue to monitor.

## 2021-12-26 NOTE — NUR
MS RN OPENING  NOTES

RECEIVED PATIENT RESTING ON BED AND A/O X4. ON ROOM AIR BREATHING EVENLY AND UNLABORED. NOT 
IN DISTRESS. WITH NO COMPLAINTS OF PAIN OR DISCOMFORT AT THIS TIME. WITH IV ACCESS AT LEFT 
AC G20, INTACT AND PATENT. SAFETY MEASURES IN PLACED. CALL LIGHT WITHIN REACH. BED ON LOWEST 
AND LOCKED POSITION, SIDE RAILS UP X2. WILL CONTINUE TO MONITOR.

## 2021-12-26 NOTE — NUR
MS RN CLOSING NOTES

PATIENT RESTING ON BED AND A/O X4. ON ROOM AIR BREATHING EVENLY AND UNLABORED. NOT IN 
DISTRESS. WITH NO COMPLAINTS OF PAIN OR DISCOMFORT AT THIS TIME. WITH IV ACCESS AT LEFT HAND 
G20, INTACT AND PATENT. DUE MEDS GIVEN. SAFETY MEASURES IN PLACED. CALL LIGHT WITHIN REACH. 
BED ON LOWEST AND LOCKED POSITION, SIDE RAILS UP X2. WILL ENDORSE TO NEXT SHIFT FOR COLIN.

## 2021-12-26 NOTE — NUR
RN NOTES:

HE WAS AWAKE IN BETWEEN, JUST NOW ABLE TO SLEEP AND REST, KEPT CALL LIGHT WITHIN EASY REACH.

## 2021-12-27 VITALS — DIASTOLIC BLOOD PRESSURE: 79 MMHG | SYSTOLIC BLOOD PRESSURE: 136 MMHG

## 2021-12-27 VITALS — DIASTOLIC BLOOD PRESSURE: 82 MMHG | SYSTOLIC BLOOD PRESSURE: 135 MMHG

## 2021-12-27 VITALS — DIASTOLIC BLOOD PRESSURE: 74 MMHG | SYSTOLIC BLOOD PRESSURE: 120 MMHG

## 2021-12-27 RX ADMIN — Medication PRN MG: at 16:30

## 2021-12-27 RX ADMIN — INSULIN HUMAN PRN UNITS: 100 INJECTION, SOLUTION PARENTERAL at 22:32

## 2021-12-27 RX ADMIN — ATORVASTATIN CALCIUM SCH MG: 40 TABLET, FILM COATED ORAL at 08:21

## 2021-12-27 RX ADMIN — Medication PRN MG: at 21:26

## 2021-12-27 RX ADMIN — ASPIRIN 81 MG SCH MG: 81 TABLET ORAL at 08:21

## 2021-12-27 RX ADMIN — DEXTROSE MONOHYDRATE SCH MLS/HR: 50 INJECTION, SOLUTION INTRAVENOUS at 13:06

## 2021-12-27 RX ADMIN — INSULIN HUMAN PRN UNITS: 100 INJECTION, SOLUTION PARENTERAL at 11:25

## 2021-12-27 RX ADMIN — Medication SCH EACH: at 17:14

## 2021-12-27 RX ADMIN — Medication SCH EACH: at 06:30

## 2021-12-27 RX ADMIN — Medication SCH EACH: at 11:24

## 2021-12-27 RX ADMIN — DEXTROSE MONOHYDRATE SCH MLS/HR: 50 INJECTION, SOLUTION INTRAVENOUS at 21:10

## 2021-12-27 RX ADMIN — Medication SCH EACH: at 22:19

## 2021-12-27 RX ADMIN — INSULIN HUMAN PRN UNITS: 100 INJECTION, SOLUTION PARENTERAL at 17:15

## 2021-12-27 RX ADMIN — INSULIN HUMAN PRN UNITS: 100 INJECTION, SOLUTION PARENTERAL at 06:31

## 2021-12-27 RX ADMIN — DEXTROSE MONOHYDRATE SCH MLS/HR: 50 INJECTION, SOLUTION INTRAVENOUS at 04:52

## 2021-12-27 RX ADMIN — ENOXAPARIN SODIUM SCH MG: 40 INJECTION SUBCUTANEOUS at 15:47

## 2021-12-27 RX ADMIN — NEOMYCIN AND POLYMYXIN B SULFATES AND BACITRACIN ZINC SCH GM: 400; 3.5; 5 OINTMENT TOPICAL at 08:24

## 2021-12-27 NOTE — NUR
RN notes

Pt is complaining of pain on R leg and foot and requesting pain med. Administered morphine 2 
mg/iv push/prn as ordered for pain. VS is stable. Safety precautions is maintained. Will 
continue to monitor.

## 2021-12-27 NOTE — NUR
RN ms opening notes

Pt is laying in bed comfortably watching TV. Pt is alert and orientedX4. Respiration is 
normal in room air. No SOB. No S/s of distress noted. IV site on L hand# 20 is clean, intact 
and flushes well. dressing on R foot is clean, intact and dry. Safety precautions is 
maintained. bed at low position, brakes locked, side rails upX3, hob elevated, call light is 
within reach. Will continue to monitor.

## 2021-12-27 NOTE — NUR
RN notes

Wound care provided as ordered. Pt refused pictures taken. Explained risks and benefits. Pt 
keep refusing.

## 2021-12-27 NOTE — NUR
MS RN CLOSING NOTES



PATIENT AWAKE IN BED, A/O X4. ON ROOM AIR, NO SOB NOTED, BREATHING EVEN AND UNLABORED. NOT 
IN ACUTE DISTRESS. MEDICATED WITH MORPHINE SULFATE FOR RIGHT LEG PAIN.  REMINDED PT NOT TO 
PUT WEIGHT ON RIGHT LEG. WITH IV ACCESS AT LEFT HAND G20, INTACT AND PATENT. IV ANTIBIOTIC 
GIVEN AS ORDERED, ALL DUE MEDS GIVEN TOLERATED WELL. SAFETY MEASURES IN PLACED. CALL LIGHT 
WITHIN REACH. BED ON LOWEST AND LOCKED POSITION, SIDE RAILS UP X2. WILL ENDORSE TO NEXT 
SHIFT.

## 2021-12-27 NOTE — NUR
MS RN OPENING  NOTES



RECEIVED PATIENT RESTING ON BED AND A/O X4. ON ROOM AIR, NO SOB NOTED, BREATHING EVEN AND 
UNLABORED. NOT IN DISTRESS. NO COMPLAINTS OF PAIN OR DISCOMFORT AT THIS TIME. WITH IV ACCESS 
AT LEFT HAND G20, INTACT AND PATENT. SAFETY MEASURES IN PLACED. CALL LIGHT WITHIN REACH. BED 
ON LOWEST AND LOCKED POSITION, SIDE RAILS UP X2. WILL CONTINUE TO MONITOR.

## 2021-12-27 NOTE — NUR
RN ms closing notes

Pt is resting in bed comfortably. Pt is alert and orientedX4. Respiration is normal in room 
air. No SOB. No S/s of distress noted. IV site on L hand# 20 is clean, intact and flushes 
well. Routine meds were given as ordered. Wound care provided as ordered. dressing on R foot 
is clean, intact and dry. Safety precautions is maintained. bed at low position, brakes 
locked, side rails upX3, hob elevated, call light is within reach. Will endorse to am nurse 
for COLIN.

## 2021-12-28 VITALS — DIASTOLIC BLOOD PRESSURE: 74 MMHG | SYSTOLIC BLOOD PRESSURE: 132 MMHG

## 2021-12-28 VITALS — SYSTOLIC BLOOD PRESSURE: 139 MMHG | DIASTOLIC BLOOD PRESSURE: 86 MMHG

## 2021-12-28 VITALS — DIASTOLIC BLOOD PRESSURE: 72 MMHG | SYSTOLIC BLOOD PRESSURE: 126 MMHG

## 2021-12-28 RX ADMIN — Medication SCH EACH: at 17:38

## 2021-12-28 RX ADMIN — DEXTROSE MONOHYDRATE SCH MLS/HR: 50 INJECTION, SOLUTION INTRAVENOUS at 04:00

## 2021-12-28 RX ADMIN — ATORVASTATIN CALCIUM SCH MG: 40 TABLET, FILM COATED ORAL at 08:37

## 2021-12-28 RX ADMIN — ENOXAPARIN SODIUM SCH MG: 40 INJECTION SUBCUTANEOUS at 18:20

## 2021-12-28 RX ADMIN — INSULIN HUMAN PRN UNITS: 100 INJECTION, SOLUTION PARENTERAL at 13:46

## 2021-12-28 RX ADMIN — Medication SCH EACH: at 06:46

## 2021-12-28 RX ADMIN — INSULIN HUMAN PRN UNITS: 100 INJECTION, SOLUTION PARENTERAL at 06:47

## 2021-12-28 RX ADMIN — Medication PRN MG: at 18:26

## 2021-12-28 RX ADMIN — Medication SCH EACH: at 22:12

## 2021-12-28 RX ADMIN — Medication SCH EACH: at 17:40

## 2021-12-28 RX ADMIN — NEOMYCIN AND POLYMYXIN B SULFATES AND BACITRACIN ZINC SCH GM: 400; 3.5; 5 OINTMENT TOPICAL at 08:37

## 2021-12-28 RX ADMIN — ASPIRIN 81 MG SCH MG: 81 TABLET ORAL at 08:37

## 2021-12-28 RX ADMIN — INSULIN HUMAN PRN UNITS: 100 INJECTION, SOLUTION PARENTERAL at 22:11

## 2021-12-28 RX ADMIN — Medication PRN MG: at 03:27

## 2021-12-28 NOTE — NUR
RN ms closing notes

Pt is resting in bed comfortably. Pt is alert and orientedX4. Respiration is normal in room 
air. No SOB. No S/s of distress noted. IV site on L hand# 20 is clean, intact and SL.  VS is 
stable. Routine meds were given as ordered including pain meds for pain management. Dressing 
on R foot is clean, intact and dry. all needs met and attended. Safety precautions is 
maintained. bed at low position, brakes locked, side rails upX3, hob elevated, call light is 
within reach. Will endorse to am nurse for COLIN.

## 2021-12-28 NOTE — NUR
RN CLOSING NOTES



PATIENT IS IN BED RESTING, AWAKE. A/O X4. NO S/S OF PAIN NOTED AT THIS TIME. ON ROOM AIR, NO 
DISTRESS OR SHORTNESS OF BREATH NOTED. IV LEFT HAND #20G, INTACT AND PATENT. FALL AND SAFETY 
MEASURES IN PLACE, BED ALARM ON, BED IN LOW AND LOCK POSITION, CALL LIGHT AND TABLE WITHIN 
EASY REACH, SIDE RAILS UP X2. WILL ENDORSE TO NIGHT SHIFT. Writer left a message for pt to discuss med changes.

## 2021-12-28 NOTE — NUR
RN OPENING NOTES



PATIENT IS IN BED RESTING, AWAKE. A/O X4. NO S/S OF PAIN NOTED AT THIS TIME. ON ROOM AIR, NO 
DISTRESS OR SHORTNESS OF BREATH NOTED. IV LEFT HAND #20G, INTACT AND PATENT. FALL AND SAFETY 
MEASURES IN PLACE, BED ALARM ON, BED IN LOW AND LOCK POSITION, CALL LIGHT AND TABLE WITHIN 
EASY REACH, SIDE RAILS UP X2. WILL CONTINUE TO MONITOR.

## 2021-12-29 VITALS — SYSTOLIC BLOOD PRESSURE: 153 MMHG | DIASTOLIC BLOOD PRESSURE: 53 MMHG

## 2021-12-29 VITALS — SYSTOLIC BLOOD PRESSURE: 148 MMHG | DIASTOLIC BLOOD PRESSURE: 79 MMHG

## 2021-12-29 VITALS — SYSTOLIC BLOOD PRESSURE: 151 MMHG | DIASTOLIC BLOOD PRESSURE: 90 MMHG

## 2021-12-29 RX ADMIN — Medication SCH EACH: at 21:56

## 2021-12-29 RX ADMIN — ENOXAPARIN SODIUM SCH MG: 40 INJECTION SUBCUTANEOUS at 15:27

## 2021-12-29 RX ADMIN — INSULIN HUMAN PRN UNITS: 100 INJECTION, SOLUTION PARENTERAL at 06:39

## 2021-12-29 RX ADMIN — NEOMYCIN AND POLYMYXIN B SULFATES AND BACITRACIN ZINC SCH GM: 400; 3.5; 5 OINTMENT TOPICAL at 08:16

## 2021-12-29 RX ADMIN — Medication SCH EACH: at 12:54

## 2021-12-29 RX ADMIN — Medication PRN MG: at 18:37

## 2021-12-29 RX ADMIN — Medication SCH EACH: at 16:57

## 2021-12-29 RX ADMIN — INSULIN HUMAN PRN UNITS: 100 INJECTION, SOLUTION PARENTERAL at 16:57

## 2021-12-29 RX ADMIN — ASPIRIN 81 MG SCH MG: 81 TABLET ORAL at 08:15

## 2021-12-29 RX ADMIN — INSULIN HUMAN PRN UNITS: 100 INJECTION, SOLUTION PARENTERAL at 22:21

## 2021-12-29 RX ADMIN — Medication SCH EACH: at 06:37

## 2021-12-29 RX ADMIN — INSULIN HUMAN PRN UNITS: 100 INJECTION, SOLUTION PARENTERAL at 13:02

## 2021-12-29 RX ADMIN — ATORVASTATIN CALCIUM SCH MG: 40 TABLET, FILM COATED ORAL at 08:15

## 2021-12-29 NOTE — NUR
MS RN OPENING NOTES



RECEIVED PATIENT AWAKE IN BED. A/O X4. NO S/S OF PAIN NOTED AT THIS TIME. PT STABLE ON ROOM 
AIR. NO SOB OR S/S OF RESPIRATORY DISTRESS AT THIS TIME. IV ACCESS LEFT HAND 20 GAUGE, 
INTACT AND PATENT. SAFETY PRECAUTIONS IN PLACE. BED IN LOWEST LOCKED POSITION, HOB ELEVATED, 
SIDE RAILS UP X2, AND CALL LIGHT AND TABLE WITHIN REACH. WILL CONTINUE WITH PLAN OF CARE.

## 2021-12-29 NOTE — NUR
MS RN OPENING NOTES



RECEIVED PATIENT IS IN BED RESTING, AWAKE. A/O X4. NO S/S OF PAIN NOTED AT THIS TIME. 
PATIENT IS BREATHING EVENLY AND NONLABORED ON ROOM AIR, NO DISTRESS OR SHORTNESS OF BREATH 
NOTED. IV ACCESS NOTED ON LEFT HAND #20G, INTACT AND PATENT. FALL AND SAFETY MEASURES IN 
PLACE, BED ALARM ON, BED IN LOW AND LOCK POSITION, CALL LIGHT AND TABLE WITHIN EASY REACH, 
SIDE RAILS UP X2. WILL CONTINUE TO MONITOR.

## 2021-12-29 NOTE — NUR
MS RN CLOSING NOTES



PATIENT IS IN BED RESTING, AWAKE. A/O X4. NO S/S OF PAIN NOTED AT THIS TIME. PATIENT IS 
BREATHING EVENLY AND NONLABORED ON ROOM AIR, NO DISTRESS OR SHORTNESS OF BREATH NOTED. IV 
ACCESS NOTED ON LEFT HAND #20G, INTACT AND PATENT. ALL MEDICATIONS GIVEN AS ORDERED, WOUND 
CARE PERFORMED DURING SHIFT. FALL AND SAFETY MEASURES IN PLACE, BED ALARM ON, BED IN LOW AND 
LOCK POSITION, CALL LIGHT AND TABLE WITHIN EASY REACH, SIDE RAILS UP X2. WILL ENDORSE TO 
ONCOMING SHIFT

## 2021-12-30 VITALS — DIASTOLIC BLOOD PRESSURE: 84 MMHG | SYSTOLIC BLOOD PRESSURE: 131 MMHG

## 2021-12-30 VITALS — SYSTOLIC BLOOD PRESSURE: 137 MMHG | DIASTOLIC BLOOD PRESSURE: 90 MMHG

## 2021-12-30 VITALS — DIASTOLIC BLOOD PRESSURE: 52 MMHG | SYSTOLIC BLOOD PRESSURE: 127 MMHG

## 2021-12-30 RX ADMIN — Medication SCH EACH: at 22:02

## 2021-12-30 RX ADMIN — ASPIRIN 81 MG SCH MG: 81 TABLET ORAL at 08:13

## 2021-12-30 RX ADMIN — NEOMYCIN AND POLYMYXIN B SULFATES AND BACITRACIN ZINC SCH GM: 400; 3.5; 5 OINTMENT TOPICAL at 08:13

## 2021-12-30 RX ADMIN — INSULIN HUMAN PRN UNITS: 100 INJECTION, SOLUTION PARENTERAL at 06:45

## 2021-12-30 RX ADMIN — Medication SCH EACH: at 16:47

## 2021-12-30 RX ADMIN — ENOXAPARIN SODIUM SCH MG: 40 INJECTION SUBCUTANEOUS at 15:36

## 2021-12-30 RX ADMIN — Medication SCH EACH: at 06:35

## 2021-12-30 RX ADMIN — INSULIN HUMAN PRN UNITS: 100 INJECTION, SOLUTION PARENTERAL at 16:48

## 2021-12-30 RX ADMIN — ATORVASTATIN CALCIUM SCH MG: 40 TABLET, FILM COATED ORAL at 08:13

## 2021-12-30 RX ADMIN — Medication SCH EACH: at 11:41

## 2021-12-30 RX ADMIN — Medication PRN MG: at 23:31

## 2021-12-30 RX ADMIN — INSULIN HUMAN PRN UNITS: 100 INJECTION, SOLUTION PARENTERAL at 11:49

## 2021-12-30 RX ADMIN — INSULIN HUMAN PRN UNITS: 100 INJECTION, SOLUTION PARENTERAL at 22:04

## 2021-12-30 NOTE — NUR
RN NOTE



PT COMPLAINED OF R FOOT PAIN 8/10. ADMINISTERED MORPHINE 2 MG AS ORDERED. VSS. WILL CONTINUE 
TO MONITOR.

## 2021-12-30 NOTE — NUR
MS RN CLOSING NOTES



PATIENT IS IN BED RESTING, AWAKE. A/O X4. NO S/S OF PAIN NOTED AT THIS TIME. PT STABLE ON 
ROOM AIR. NO SOB OR S/S OF RESPIRATORY DISTRESS NOTED. IV ACCESS NOTED ON LEFT HAND #20G, 
INTACT AND PATENT. ALL MEDICATIONS GIVEN AS ORDERED, WOUND CARE PERFORMED DURING SHIFT. ALL 
NEEDS MET AT THIS TIME. SAFETY PRECAUTIONS MAINTAINED AT ALL TIMES. BED IN LOWEST LOCKED 
POSITION, HOB ELEVATED, SIDE RAILS UP X2, AND CALL LIGHT AND TABLE WITHIN REACH. WILL 
ENDORSE TO ONCOMING NURSE FOR COLIN.

## 2021-12-31 VITALS — DIASTOLIC BLOOD PRESSURE: 81 MMHG | SYSTOLIC BLOOD PRESSURE: 135 MMHG

## 2021-12-31 VITALS — SYSTOLIC BLOOD PRESSURE: 123 MMHG | DIASTOLIC BLOOD PRESSURE: 78 MMHG

## 2021-12-31 VITALS — SYSTOLIC BLOOD PRESSURE: 124 MMHG | DIASTOLIC BLOOD PRESSURE: 76 MMHG

## 2021-12-31 RX ADMIN — ATORVASTATIN CALCIUM SCH MG: 40 TABLET, FILM COATED ORAL at 08:28

## 2021-12-31 RX ADMIN — INSULIN HUMAN PRN UNITS: 100 INJECTION, SOLUTION PARENTERAL at 21:57

## 2021-12-31 RX ADMIN — INSULIN HUMAN PRN UNITS: 100 INJECTION, SOLUTION PARENTERAL at 11:47

## 2021-12-31 RX ADMIN — NEOMYCIN AND POLYMYXIN B SULFATES AND BACITRACIN ZINC SCH GM: 400; 3.5; 5 OINTMENT TOPICAL at 08:30

## 2021-12-31 RX ADMIN — Medication SCH EACH: at 11:35

## 2021-12-31 RX ADMIN — Medication SCH EACH: at 21:55

## 2021-12-31 RX ADMIN — Medication SCH EACH: at 06:30

## 2021-12-31 RX ADMIN — ENOXAPARIN SODIUM SCH MG: 40 INJECTION SUBCUTANEOUS at 15:25

## 2021-12-31 RX ADMIN — INSULIN HUMAN PRN UNITS: 100 INJECTION, SOLUTION PARENTERAL at 17:18

## 2021-12-31 RX ADMIN — Medication PRN MG: at 23:44

## 2021-12-31 RX ADMIN — INSULIN HUMAN PRN UNITS: 100 INJECTION, SOLUTION PARENTERAL at 06:32

## 2021-12-31 RX ADMIN — ASPIRIN 81 MG SCH MG: 81 TABLET ORAL at 08:28

## 2021-12-31 RX ADMIN — Medication SCH EACH: at 17:15

## 2021-12-31 NOTE — NUR
RN CLOSING NOTE



PT IN BED,  A/O X4. NO S/S OF PAIN NOTED AT THIS TIME. PT STABLE ON ROOM AIR. NO SOB OR S/S 
OF RESPIRATORY DISTRESS AT THIS TIME. IV ACCESS LEFT HAND 20 GAUGE. DRESSING DRY INTACT TO 
RT FOOT. ELEVATED ON PILLOWS. SAFETY PRECAUTIONS IN PLACE. BED IN LOWEST LOCKED POSITION, 
HOB ELEVATED, SIDE RAILS UP X2, AND CALL LIGHT AND TABLE WITHIN REACH. WILL CONTINUE TO 
MONITOR.

-------------------------------------------------------------------------------

Addendum: 12/31/21 at 1919 by MARYLOU CULLEN RN

-------------------------------------------------------------------------------

OPENING NOTES

## 2021-12-31 NOTE — NUR
RN NOTE- DRESSING CHANGE TO RT FOOT WOUND, OLD DRESSING REMOVED. SCANT AMOUNT OF SEROUS 
DRAINAGE. NO CRUSTING, NO ERYTHEMA, NO EXUDATE, NO PURULENCE OR ODOR. SUTURE LINE INTACT. NO 
DEHISCENCE PRESENT. CLEANSED W NS, PETROLEUM DRESSING APPLIED. WRAPPED W KERLIX, TAPED 
SECURELY. TOLERATED WELL.

## 2021-12-31 NOTE — NUR
RN OPENING NOTE- PT ASLEEP IN BED. EASILY AWAKENED. A/O X4. NO S/S OF PAIN NOTED AT THIS 
TIME. PT STABLE ON ROOM AIR. NO SOB OR S/S OF RESPIRATORY DISTRESS AT THIS TIME. IV ACCESS 
LEFT HAND 20 GAUGE. SAFETY PRECAUTIONS IN PLACE. BED IN LOWEST LOCKED POSITION, HOB 
ELEVATED, SIDE RAILS UP X2, AND CALL LIGHT AND TABLE WITHIN REACH. WILL CONTINUE WITH PLAN 
OF CARE.

## 2021-12-31 NOTE — NUR
RN CLOSING NOTE-PT IN BED,  A/O X4. NO S/S OF PAIN NOTED AT THIS TIME. PT STABLE ON ROOM 
AIR. NO SOB OR S/S OF RESPIRATORY DISTRESS AT THIS TIME. IV ACCESS LEFT HAND 20 GAUGE. 
DRESSING DRY INTACT TO RT FOOT. ELEVATED ON PILLOWS. SAFETY PRECAUTIONS IN PLACE. BED IN 
LOWEST LOCKED POSITION, HOB ELEVATED, SIDE RAILS UP X2, AND CALL LIGHT AND TABLE WITHIN 
REACH. WILL CONTINUE WITH PLAN OF CARE.

## 2022-01-01 VITALS — SYSTOLIC BLOOD PRESSURE: 139 MMHG | DIASTOLIC BLOOD PRESSURE: 78 MMHG

## 2022-01-01 RX ADMIN — NEOMYCIN AND POLYMYXIN B SULFATES AND BACITRACIN ZINC SCH GM: 400; 3.5; 5 OINTMENT TOPICAL at 08:28

## 2022-01-01 RX ADMIN — Medication PRN MG: at 08:42

## 2022-01-01 RX ADMIN — INSULIN HUMAN PRN UNITS: 100 INJECTION, SOLUTION PARENTERAL at 06:46

## 2022-01-01 RX ADMIN — Medication SCH EACH: at 06:45

## 2022-01-01 RX ADMIN — ASPIRIN 81 MG SCH MG: 81 TABLET ORAL at 08:25

## 2022-01-01 RX ADMIN — ATORVASTATIN CALCIUM SCH MG: 40 TABLET, FILM COATED ORAL at 08:25

## 2022-01-01 NOTE — NUR
RN CLOSING NOTE



PT IN BED,  A/O X4. NO S/S OF PAIN NOTED AT THIS TIME. PT STABLE ON ROOM AIR. NO SOB OR S/S 
OF RESPIRATORY DISTRESS AT THIS TIME. IV ACCESS LEFT HAND 20 GAUGE. DRESSING DRY INTACT TO 
RT FOOT. ELEVATED ON PILLOWS. SAFETY PRECAUTIONS IN PLACE. BED IN LOWEST LOCKED POSITION, 
HOB ELEVATED, SIDE RAILS UP X2, AND CALL LIGHT AND TABLE WITHIN REACH. WILL endorse care to 
day shift nurse.

## 2022-01-01 NOTE — NUR
RN NOTES



ENDORSED BY NIGHT SHIFT RN, FOR D/C TO HOME TODAY. PATIENT RESTING IN BED AWAKE AND VERBALLY 
RESPONSIVE. A/O X4, AWARE OF PLAN OF CARE. ASSISTED W/ SETTING UP FWW DEVICE AT BEDSIDE.

## 2022-01-01 NOTE — NUR
RN NOTES



PATIENT FOR DISCHARGE TO HOME TODAY W/ HH FOLLOW-UP. DISCHARGE INSTRUCTION AND EDUCATION 
PROVIDED TO PATIENT; PREFERRED PHARMACY IN SYSTEM NOT THE PHARMACY OF CHOICE PER PATIENT. 
DISCHARGE FORM AND BELONGINGS LIST FORM SIGNED BY PATIENT AND ALL BELONGINGS ACCOUNTED FOR. 
EXPLAINED THAT WILL TRY TO CALL [facility] FOR TRANSFER OF INFORMATION BUT EMPHASIZED TO  
MEDS AT [facility] WHERE MEDS HAVE BEEN TRANSMITTED; VERBALIZED UNDERSTANDING. IV LINE 
REMOVED. R FOOT W/ DRESSING DRY AND INTACT; PATIENT PREFERRED NOT TO HAVE PICTURE TAKEN AT 
THIS TIME AS AREA IS WRAPPED W/ DRESSING ALREADY. PATIENT IS AMBULATORY AND ABLE TO USE FWW 
ASSISTIVE DEVICE. ACCOMPANIED PATIENT TO THE LOBBY AND PICKED UP BY SISTER VIA PRIVATE CAR. 
CHARGE NURSE AND MD AWARE OF DISCHARGE.